# Patient Record
Sex: MALE | Race: WHITE | URBAN - METROPOLITAN AREA
[De-identification: names, ages, dates, MRNs, and addresses within clinical notes are randomized per-mention and may not be internally consistent; named-entity substitution may affect disease eponyms.]

---

## 2019-05-28 PROCEDURE — 88305 TISSUE EXAM BY PATHOLOGIST: CPT | Performed by: INTERNAL MEDICINE

## 2019-05-30 PROCEDURE — 88305 TISSUE EXAM BY PATHOLOGIST: CPT | Performed by: INTERNAL MEDICINE

## 2020-02-09 ENCOUNTER — HOSPITAL ENCOUNTER (INPATIENT)
Facility: HOSPITAL | Age: 65
LOS: 3 days | Discharge: HOME | DRG: 378 | End: 2020-02-12
Attending: EMERGENCY MEDICINE | Admitting: HOSPITALIST
Payer: MEDICARE

## 2020-02-09 DIAGNOSIS — K92.2 GASTROINTESTINAL HEMORRHAGE, UNSPECIFIED GASTROINTESTINAL HEMORRHAGE TYPE: Primary | ICD-10-CM

## 2020-02-09 LAB
ALBUMIN SERPL-MCNC: 3.3 G/DL (ref 3.4–5)
ALBUMIN/GLOB SERPL: 0.8 {RATIO} (ref 1–2)
ALP LIVER SERPL-CCNC: 82 U/L (ref 45–117)
ALT SERPL-CCNC: 35 U/L (ref 16–61)
ANION GAP SERPL CALC-SCNC: 4 MMOL/L (ref 0–18)
ANTIBODY SCREEN: NEGATIVE
APTT PPP: 31.4 SECONDS (ref 25.4–36.1)
AST SERPL-CCNC: 27 U/L (ref 15–37)
BASOPHILS # BLD AUTO: 0.05 X10(3) UL (ref 0–0.2)
BASOPHILS # BLD AUTO: 0.08 X10(3) UL (ref 0–0.2)
BASOPHILS NFR BLD AUTO: 0.6 %
BASOPHILS NFR BLD AUTO: 0.8 %
BILIRUB SERPL-MCNC: 0.8 MG/DL (ref 0.1–2)
BUN BLD-MCNC: 30 MG/DL (ref 7–18)
BUN/CREAT SERPL: 24.6 (ref 10–20)
CALCIUM BLD-MCNC: 9.1 MG/DL (ref 8.5–10.1)
CHLORIDE SERPL-SCNC: 109 MMOL/L (ref 98–112)
CO2 SERPL-SCNC: 27 MMOL/L (ref 21–32)
CREAT BLD-MCNC: 1.22 MG/DL (ref 0.7–1.3)
DEPRECATED RDW RBC AUTO: 45 FL (ref 35.1–46.3)
DEPRECATED RDW RBC AUTO: 45.2 FL (ref 35.1–46.3)
EOSINOPHIL # BLD AUTO: 0.08 X10(3) UL (ref 0–0.7)
EOSINOPHIL # BLD AUTO: 0.19 X10(3) UL (ref 0–0.7)
EOSINOPHIL NFR BLD AUTO: 1 %
EOSINOPHIL NFR BLD AUTO: 2 %
ERYTHROCYTE [DISTWIDTH] IN BLOOD BY AUTOMATED COUNT: 13.9 % (ref 11–15)
ERYTHROCYTE [DISTWIDTH] IN BLOOD BY AUTOMATED COUNT: 14 % (ref 11–15)
GLOBULIN PLAS-MCNC: 4 G/DL (ref 2.8–4.4)
GLUCOSE BLD-MCNC: 162 MG/DL (ref 70–99)
GLUCOSE BLD-MCNC: 210 MG/DL (ref 70–99)
HCT VFR BLD AUTO: 34 % (ref 39–53)
HCT VFR BLD AUTO: 41.6 % (ref 39–53)
HGB BLD-MCNC: 11 G/DL (ref 13–17.5)
HGB BLD-MCNC: 13.5 G/DL (ref 13–17.5)
IMM GRANULOCYTES # BLD AUTO: 0.02 X10(3) UL (ref 0–1)
IMM GRANULOCYTES # BLD AUTO: 0.03 X10(3) UL (ref 0–1)
IMM GRANULOCYTES NFR BLD: 0.2 %
IMM GRANULOCYTES NFR BLD: 0.3 %
INR BLD: 1.03 (ref 0.9–1.1)
LYMPHOCYTES # BLD AUTO: 1.57 X10(3) UL (ref 1–4)
LYMPHOCYTES # BLD AUTO: 2.02 X10(3) UL (ref 1–4)
LYMPHOCYTES NFR BLD AUTO: 19.5 %
LYMPHOCYTES NFR BLD AUTO: 21 %
M PROTEIN MFR SERPL ELPH: 7.3 G/DL (ref 6.4–8.2)
MCH RBC QN AUTO: 28.7 PG (ref 26–34)
MCH RBC QN AUTO: 29 PG (ref 26–34)
MCHC RBC AUTO-ENTMCNC: 32.4 G/DL (ref 31–37)
MCHC RBC AUTO-ENTMCNC: 32.5 G/DL (ref 31–37)
MCV RBC AUTO: 88.8 FL (ref 80–100)
MCV RBC AUTO: 89.5 FL (ref 80–100)
MONOCYTES # BLD AUTO: 0.62 X10(3) UL (ref 0.1–1)
MONOCYTES # BLD AUTO: 0.63 X10(3) UL (ref 0.1–1)
MONOCYTES NFR BLD AUTO: 6.4 %
MONOCYTES NFR BLD AUTO: 7.8 %
NEUTROPHILS # BLD AUTO: 5.7 X10 (3) UL (ref 1.5–7.7)
NEUTROPHILS # BLD AUTO: 5.7 X10(3) UL (ref 1.5–7.7)
NEUTROPHILS # BLD AUTO: 6.7 X10 (3) UL (ref 1.5–7.7)
NEUTROPHILS # BLD AUTO: 6.7 X10(3) UL (ref 1.5–7.7)
NEUTROPHILS NFR BLD AUTO: 69.5 %
NEUTROPHILS NFR BLD AUTO: 70.9 %
OSMOLALITY SERPL CALC.SUM OF ELEC: 302 MOSM/KG (ref 275–295)
PLATELET # BLD AUTO: 188 10(3)UL (ref 150–450)
PLATELET # BLD AUTO: 232 10(3)UL (ref 150–450)
POTASSIUM SERPL-SCNC: 3.9 MMOL/L (ref 3.5–5.1)
PSA SERPL DL<=0.01 NG/ML-MCNC: 13.9 SECONDS (ref 12.5–14.7)
RBC # BLD AUTO: 3.83 X10(6)UL (ref 3.8–5.8)
RBC # BLD AUTO: 4.65 X10(6)UL (ref 3.8–5.8)
RH BLOOD TYPE: POSITIVE
SODIUM SERPL-SCNC: 140 MMOL/L (ref 136–145)
WBC # BLD AUTO: 8.1 X10(3) UL (ref 4–11)
WBC # BLD AUTO: 9.6 X10(3) UL (ref 4–11)

## 2020-02-09 PROCEDURE — 99291 CRITICAL CARE FIRST HOUR: CPT | Performed by: NURSE PRACTITIONER

## 2020-02-09 PROCEDURE — 99223 1ST HOSP IP/OBS HIGH 75: CPT | Performed by: STUDENT IN AN ORGANIZED HEALTH CARE EDUCATION/TRAINING PROGRAM

## 2020-02-09 RX ORDER — SODIUM CHLORIDE 9 MG/ML
INJECTION, SOLUTION INTRAVENOUS CONTINUOUS
Status: DISCONTINUED | OUTPATIENT
Start: 2020-02-09 | End: 2020-02-10

## 2020-02-09 NOTE — H&P
ZAN HOSPITALIST  History and Physical     Corey Cue Patient Status:  Emergency    4/3/1955 MRN [id number]   Location [address] Attending Felisa Marquez MD   Hosp Day # 0 PCP Chandan Rajan MD     Chief Complain appears weak  Respiratory: Clear to auscultation bilaterally. No wheezes. No rhonchi. Cardiovascular: S1, S2. Regular rate and rhythm. No murmurs, rubs or gallops. Equal pulses. Chest and Back: No tenderness or deformity.   Abdomen: Soft, nontender, nond

## 2020-02-09 NOTE — ED INITIAL ASSESSMENT (HPI)
Patient relates he noticed black stools that began yesterday. Patient denies abd pain. Patient also complaining of diarrhea.

## 2020-02-09 NOTE — CONSULTS
[address] [facility],[address] Patient Status:  Emergency    4/3/1955 MRN [id number]   Location [address] Attending Deandra Solorio MD   Hosp Day # 0 PCP MD Corie Moynat Monte is a 75year old male skin lesions  HEENT: denies jaundice   LUNGS: denies SOB   CV: denies chest pain GI: denies dysphagia, N/V  : denies hematuria    MSK: has no joint painENDOCR: denies diabetes or thyroid history  EXAM:   /77   Pulse 72   Temp 97 °F (36.1 °C) (Tempo

## 2020-02-09 NOTE — ED NOTES
Pt moved to room C1 at this time after witnessed syncopal episode by PCT Radha Resendez. Pt placed on cardiac monitor, ED physician to bedside. Skin appears diaphoretic, pt is pale. 2nd IV established, vital signs obtained.

## 2020-02-10 ENCOUNTER — ANESTHESIA EVENT (OUTPATIENT)
Dept: ENDOSCOPY | Facility: HOSPITAL | Age: 65
DRG: 378 | End: 2020-02-10
Payer: MEDICARE

## 2020-02-10 ENCOUNTER — ANESTHESIA (OUTPATIENT)
Dept: ENDOSCOPY | Facility: HOSPITAL | Age: 65
DRG: 378 | End: 2020-02-10
Payer: MEDICARE

## 2020-02-10 LAB
ANION GAP SERPL CALC-SCNC: 5 MMOL/L (ref 0–18)
ATRIAL RATE: 66 BPM
BASOPHILS # BLD AUTO: 0.04 X10(3) UL (ref 0–0.2)
BASOPHILS NFR BLD AUTO: 0.5 %
BUN BLD-MCNC: 26 MG/DL (ref 7–18)
BUN/CREAT SERPL: 28.3 (ref 10–20)
CALCIUM BLD-MCNC: 8 MG/DL (ref 8.5–10.1)
CHLORIDE SERPL-SCNC: 112 MMOL/L (ref 98–112)
CO2 SERPL-SCNC: 27 MMOL/L (ref 21–32)
CREAT BLD-MCNC: 0.92 MG/DL (ref 0.7–1.3)
DEPRECATED RDW RBC AUTO: 46.1 FL (ref 35.1–46.3)
EOSINOPHIL # BLD AUTO: 0.07 X10(3) UL (ref 0–0.7)
EOSINOPHIL NFR BLD AUTO: 0.8 %
ERYTHROCYTE [DISTWIDTH] IN BLOOD BY AUTOMATED COUNT: 14.1 % (ref 11–15)
GLUCOSE BLD-MCNC: 136 MG/DL (ref 70–99)
HCT VFR BLD AUTO: 33.9 % (ref 39–53)
HGB BLD-MCNC: 10 G/DL (ref 13–17.5)
HGB BLD-MCNC: 11 G/DL (ref 13–17.5)
HGB BLD-MCNC: 11 G/DL (ref 13–17.5)
HGB BLD-MCNC: 11.1 G/DL (ref 13–17.5)
HGB BLD-MCNC: 11.4 G/DL (ref 13–17.5)
IMM GRANULOCYTES # BLD AUTO: 0.03 X10(3) UL (ref 0–1)
IMM GRANULOCYTES NFR BLD: 0.3 %
LYMPHOCYTES # BLD AUTO: 2.17 X10(3) UL (ref 1–4)
LYMPHOCYTES NFR BLD AUTO: 25.2 %
MCH RBC QN AUTO: 29.3 PG (ref 26–34)
MCHC RBC AUTO-ENTMCNC: 32.4 G/DL (ref 31–37)
MCV RBC AUTO: 90.4 FL (ref 80–100)
MONOCYTES # BLD AUTO: 0.62 X10(3) UL (ref 0.1–1)
MONOCYTES NFR BLD AUTO: 7.2 %
NEUTROPHILS # BLD AUTO: 5.69 X10 (3) UL (ref 1.5–7.7)
NEUTROPHILS # BLD AUTO: 5.69 X10(3) UL (ref 1.5–7.7)
NEUTROPHILS NFR BLD AUTO: 66 %
OSMOLALITY SERPL CALC.SUM OF ELEC: 305 MOSM/KG (ref 275–295)
P AXIS: 24 DEGREES
P-R INTERVAL: 170 MS
PLATELET # BLD AUTO: 176 10(3)UL (ref 150–450)
POTASSIUM SERPL-SCNC: 3.9 MMOL/L (ref 3.5–5.1)
Q-T INTERVAL: 400 MS
QRS DURATION: 88 MS
QTC CALCULATION (BEZET): 419 MS
R AXIS: 13 DEGREES
RBC # BLD AUTO: 3.75 X10(6)UL (ref 3.8–5.8)
SODIUM SERPL-SCNC: 144 MMOL/L (ref 136–145)
T AXIS: 39 DEGREES
VENTRICULAR RATE: 66 BPM
WBC # BLD AUTO: 8.6 X10(3) UL (ref 4–11)

## 2020-02-10 PROCEDURE — 0W3P8ZZ CONTROL BLEEDING IN GASTROINTESTINAL TRACT, VIA NATURAL OR ARTIFICIAL OPENING ENDOSCOPIC: ICD-10-PCS | Performed by: INTERNAL MEDICINE

## 2020-02-10 PROCEDURE — 0DB68ZX EXCISION OF STOMACH, VIA NATURAL OR ARTIFICIAL OPENING ENDOSCOPIC, DIAGNOSTIC: ICD-10-PCS | Performed by: INTERNAL MEDICINE

## 2020-02-10 PROCEDURE — 99233 SBSQ HOSP IP/OBS HIGH 50: CPT | Performed by: STUDENT IN AN ORGANIZED HEALTH CARE EDUCATION/TRAINING PROGRAM

## 2020-02-10 RX ORDER — SODIUM CHLORIDE, SODIUM LACTATE, POTASSIUM CHLORIDE, CALCIUM CHLORIDE 600; 310; 30; 20 MG/100ML; MG/100ML; MG/100ML; MG/100ML
INJECTION, SOLUTION INTRAVENOUS CONTINUOUS
Status: CANCELLED | OUTPATIENT
Start: 2020-02-10

## 2020-02-10 RX ORDER — SUCRALFATE ORAL 1 G/10ML
1 SUSPENSION ORAL
Status: DISCONTINUED | OUTPATIENT
Start: 2020-02-10 | End: 2020-02-11

## 2020-02-10 RX ORDER — LIDOCAINE HYDROCHLORIDE 10 MG/ML
INJECTION, SOLUTION EPIDURAL; INFILTRATION; INTRACAUDAL; PERINEURAL AS NEEDED
Status: DISCONTINUED | OUTPATIENT
Start: 2020-02-10 | End: 2020-02-10 | Stop reason: SURG

## 2020-02-10 RX ORDER — SODIUM CHLORIDE, SODIUM LACTATE, POTASSIUM CHLORIDE, CALCIUM CHLORIDE 600; 310; 30; 20 MG/100ML; MG/100ML; MG/100ML; MG/100ML
INJECTION, SOLUTION INTRAVENOUS CONTINUOUS PRN
Status: DISCONTINUED | OUTPATIENT
Start: 2020-02-10 | End: 2020-02-10 | Stop reason: SURG

## 2020-02-10 RX ORDER — DEXTROSE MONOHYDRATE 25 G/50ML
50 INJECTION, SOLUTION INTRAVENOUS
Status: CANCELLED | OUTPATIENT
Start: 2020-02-10

## 2020-02-10 RX ORDER — NALOXONE HYDROCHLORIDE 0.4 MG/ML
80 INJECTION, SOLUTION INTRAMUSCULAR; INTRAVENOUS; SUBCUTANEOUS AS NEEDED
Status: CANCELLED | OUTPATIENT
Start: 2020-02-10 | End: 2020-02-10

## 2020-02-10 RX ADMIN — LIDOCAINE HYDROCHLORIDE 50 MG: 10 INJECTION, SOLUTION EPIDURAL; INFILTRATION; INTRACAUDAL; PERINEURAL at 11:13:00

## 2020-02-10 RX ADMIN — SODIUM CHLORIDE, SODIUM LACTATE, POTASSIUM CHLORIDE, CALCIUM CHLORIDE: 600; 310; 30; 20 INJECTION, SOLUTION INTRAVENOUS at 11:07:00

## 2020-02-10 NOTE — CONSULTS
Pulmonary H&P/Consult       NAME: Frida De Leon - ROOM: [address] [place], [place] [address] - MRN: [id number] - Age: 75year old - :  4/3/1955    Date of Admission: 2020  4:19 PM  Admission Diagnosis: Gastrointestinal hemorrhage, unspecified gastrointestinal hemorrhage t Non-medical: Not on file    Tobacco Use      Smoking status: Former Smoker        Packs/day: 0.30        Years: 2.00        Pack years: .6        Types: Cigarettes        Quit date: 1976        Years since quittin.1      Smokeless tobacco: Never U GI: see above  :  denies dysuria or changes in stream   MUSCULOSKELETAL:  denies back pain   NEURO:  denies headaches, no strokes or seizure history   PSYCHE:  denies depression or anxiety   HEMATOLOGIC:  denies hx of anemia   ENDOCRINE:  denies thyroi quadrants,     no masses, no organomegaly   Extremities:   Extremities normal, atraumatic, no cyanosis or edema   Pulses:   2+ and symmetric all extremities   Skin:   Skin color, texture, turgor normal, no rashes or lesions   Neurologic:   CNII-XII intact.

## 2020-02-10 NOTE — BRIEF OP NOTE
Pre-Operative Diagnosis: hematemesis / hematochezia     Post-Operative Diagnosis: Sever gastritis, hiatal hernia, duodenal ulcers with visable vessels       Procedure Performed:   Procedure(s):  Esophagogastroduodenoscopy with bipoler     Surgeon(s) and Ro

## 2020-02-10 NOTE — PLAN OF CARE
Patient admitted to ICU, family at bedside. Alert, oriented, able to make needs known. Mild abdominal pain has resolved. No nausea or vomiting. 1 loose dark red BM. Hemoglobin stable overnight. VSS. On room air. Voiding in urinal. IVF infusing per order.  Fithian Medici

## 2020-02-10 NOTE — ED PROVIDER NOTES
Patient Seen in: [facility] 4sw-a      History   Patient presents with:  GI Bleeding    Stated Complaint: blood in stool starting yesterday, weakness, fatigue    HPI    Patient is a 57-year-old male comes in emergency room for evaluation of black st (Temporal)   Resp 14   Ht 177.8 cm (5' 10\")   Wt 102.1 kg   SpO2 96%   BMI 32.28 kg/m²         Physical Exam    GENERAL: No acute distress, well appearing and non-toxic, Alert and oriented X 3   HEENT: Normocephalic, atraumatic. Moist mucous membranes. ---------                               -----------         ------                     CBC W/ DIFFERENTIAL[[id number]]                              Final result                 Please view results for these tests on the individual orders.    CBC WITH DI gastroneurology, Dr. Kalli Shepard who evaluated patient in the emergency room. Also discussed case with critical care, Dr. Glee Nissen hospitalist, Dr. Han Lu. MDM     Patient is a 41-year-old male comes in emergency room for evaluation of GI bleed.   Syncopal

## 2020-02-10 NOTE — ED NOTES
Pt lying flat on stretcher dr. Jakub Wheat @. Pt is pale skin is warm and dry. +smell of melena. Pt c/o feeling bloated. +abd. Distention soft to touch bs hypoactive x 4.  Family at

## 2020-02-10 NOTE — PLAN OF CARE
Patient hemodynamically stable. Afebrile. One red streaked stool this morning. EGD completed late this morning. Tolerating clear liquid diet. Ambulating to the bathroom-asymptomatic. Carafate started. IV protonix scheduled BID.  Stable hemoglobin (scheduled

## 2020-02-10 NOTE — OPERATIVE REPORT
PATIENT NAME: Bryanna Hale  MRN: [id number]  DATE OF OPERATION: 2/10/20  REFERRING PHYSICIAN: Dr. Josh Holt  Medications:  MAC  PREOPERATIVE DIAGNOSIS    Hematemesis  POSTOPERATIVE DIAGNOSIS:  1. 3 cm hiatal hernia  2.  Severe gastritis with multiple 2 - written summary of today's endoscopic findings  2. Carafate 1 gm po tid. Protonix 40 mg iv bid. 3. Clear liquid diet. 4. No asa or nsaids - as current UGI bleeding episode occurred with use of aspirin last week. 5. Monitor H and H.  Would observe pt in

## 2020-02-10 NOTE — ANESTHESIA POSTPROCEDURE EVALUATION
[address] [facility],[address] Patient Status:  Inpatient   Age/Gender 75year old male MRN [id number]   Location [facility]. Attending Bhumi Childress MD   Hosp Day # 1 PCP Megan Nguyen MD       Anesthesia Post-op Note    Procedure(s):

## 2020-02-10 NOTE — ANESTHESIA PREPROCEDURE EVALUATION
PRE-OP EVALUATION    Patient Name: Silvano Landon    Pre-op Diagnosis: hematemesis / hematochezia    Procedure(s):  Esophagogastroduodenoscopy    Surgeon(s) and Role:     * Maxx Pinto MD - Primary    Pre-op vitals reviewed.   Temp: 98.4 °F (36.9 ° Currently      Comment: occ      Drug use: No     Available pre-op labs reviewed.   Lab Results   Component Value Date    WBC 8.6 02/10/2020    RBC 3.75 (L) 02/10/2020    HGB 11.0 (L) 02/10/2020    HGB 11.0 (L) 02/10/2020    HCT 33.9 (L) 02/10/2020    MCV 9

## 2020-02-10 NOTE — PROGRESS NOTES
ICU  Critical Care APRN Progress Note    NAME: Normand Heimlich - ROOM: [address] [place] [address] - MRN: [id number] - Age: 75year old - :4/3/1955    History Of Present Illness:  Normand Heimlich is a 75year old male with PMHx significant for diverticulitis and duo stated age  Neck: No JVD, neck supple, no adenopathy, trachea midline, no carotid bruits  Lungs: Clear to auscultation bilaterally, respirations unlabored  Heart: Regular rate and rhythm, S1 and S2 normal, no murmur, rub or gallop  Abdomen: Soft, non-tende (exclusive of billable procedures) was administered. This involved direct patient intervention, complex decision making, and/or extensive discussions (>50% face to face time) with the patient, family, and clinical staff.     JHONATAN Dee-BC  ICU  Phone

## 2020-02-10 NOTE — BH RN ADMISSION NOTE
Received patient from ED A&Ox4 on RA. Patient reported moderate amount of dark emesis upon arrival to unit. VSS. Hgb ordered Q6H, IVF started on patient. No c/o nausea or pain upon initial assessment. Family at bedside and updated on plan of care.

## 2020-02-10 NOTE — PROGRESS NOTES
[facility] HOSPITALIST  Progress Note     Iraj Grider Patient Status:  Inpatient    4/3/1955 MRN [id number]   [facility] 4SW-A Attending Jaylin Blandon MD   Hosp Day # 1 PCP Chalino Duran MD     Chief Complaint: Melena  S:  Denies any duodenal ulcers no bleeding  2. Trend H-H  3. PPI BID, carafate tid   4. CLD  5. Monitor for 48 hrs   6. No ASA/ NSAIDS    7. Transfuse for hb < 7  8.  GI on Cs  PLAN:  CLD  PPI, carafate tid  Monitor 48 hrs    Monitor H&H   Needs f/u TCC clinic and wants n

## 2020-02-10 NOTE — PROGRESS NOTES
[facility] HOSPITALIST  Progress Note     Gamaliel Bare Patient Status:  Inpatient    4/3/1955 MRN [id number]   [facility] 4SW-A Attending Inez Stuart MD   Hosp Day # 1 PCP Gabrielle Guzman MD     Chief Complaint: GIB     S: Patient  Seen hours.         Imaging: Imaging data reviewed in Epic. Medications:   • sucralfate  1 g Oral TID AC and HS   • pantoprazole (PROTONIX) IV push  40 mg Intravenous Q12H       ASSESSMENT / PLAN:     1. Acute blood loss anemia due to GIB  2. GIB from DU  1.

## 2020-02-11 LAB
ANION GAP SERPL CALC-SCNC: 5 MMOL/L (ref 0–18)
BASOPHILS # BLD AUTO: 0.04 X10(3) UL (ref 0–0.2)
BASOPHILS # BLD AUTO: 0.05 X10(3) UL (ref 0–0.2)
BASOPHILS NFR BLD AUTO: 0.6 %
BASOPHILS NFR BLD AUTO: 0.7 %
BUN BLD-MCNC: 16 MG/DL (ref 7–18)
BUN/CREAT SERPL: 16.8 (ref 10–20)
C DIFF TOX B STL QL: NEGATIVE
CALCIUM BLD-MCNC: 8 MG/DL (ref 8.5–10.1)
CHLORIDE SERPL-SCNC: 111 MMOL/L (ref 98–112)
CO2 SERPL-SCNC: 26 MMOL/L (ref 21–32)
CREAT BLD-MCNC: 0.95 MG/DL (ref 0.7–1.3)
DEPRECATED RDW RBC AUTO: 44.8 FL (ref 35.1–46.3)
DEPRECATED RDW RBC AUTO: 46.1 FL (ref 35.1–46.3)
EOSINOPHIL # BLD AUTO: 0.12 X10(3) UL (ref 0–0.7)
EOSINOPHIL # BLD AUTO: 0.15 X10(3) UL (ref 0–0.7)
EOSINOPHIL NFR BLD AUTO: 1.7 %
EOSINOPHIL NFR BLD AUTO: 2.3 %
ERYTHROCYTE [DISTWIDTH] IN BLOOD BY AUTOMATED COUNT: 14 % (ref 11–15)
ERYTHROCYTE [DISTWIDTH] IN BLOOD BY AUTOMATED COUNT: 14.3 % (ref 11–15)
GLUCOSE BLD-MCNC: 100 MG/DL (ref 70–99)
HCT VFR BLD AUTO: 31 % (ref 39–53)
HCT VFR BLD AUTO: 32.5 % (ref 39–53)
HGB BLD-MCNC: 10.2 G/DL (ref 13–17.5)
HGB BLD-MCNC: 10.9 G/DL (ref 13–17.5)
IMM GRANULOCYTES # BLD AUTO: 0.01 X10(3) UL (ref 0–1)
IMM GRANULOCYTES # BLD AUTO: 0.06 X10(3) UL (ref 0–1)
IMM GRANULOCYTES NFR BLD: 0.2 %
IMM GRANULOCYTES NFR BLD: 0.8 %
LYMPHOCYTES # BLD AUTO: 1.83 X10(3) UL (ref 1–4)
LYMPHOCYTES # BLD AUTO: 2.15 X10(3) UL (ref 1–4)
LYMPHOCYTES NFR BLD AUTO: 25.4 %
LYMPHOCYTES NFR BLD AUTO: 32.3 %
MCH RBC QN AUTO: 29.3 PG (ref 26–34)
MCH RBC QN AUTO: 29.4 PG (ref 26–34)
MCHC RBC AUTO-ENTMCNC: 32.9 G/DL (ref 31–37)
MCHC RBC AUTO-ENTMCNC: 33.5 G/DL (ref 31–37)
MCV RBC AUTO: 87.6 FL (ref 80–100)
MCV RBC AUTO: 89.1 FL (ref 80–100)
MONOCYTES # BLD AUTO: 0.4 X10(3) UL (ref 0.1–1)
MONOCYTES # BLD AUTO: 0.5 X10(3) UL (ref 0.1–1)
MONOCYTES NFR BLD AUTO: 6 %
MONOCYTES NFR BLD AUTO: 6.9 %
NEUTROPHILS # BLD AUTO: 3.9 X10 (3) UL (ref 1.5–7.7)
NEUTROPHILS # BLD AUTO: 3.9 X10(3) UL (ref 1.5–7.7)
NEUTROPHILS # BLD AUTO: 4.64 X10 (3) UL (ref 1.5–7.7)
NEUTROPHILS # BLD AUTO: 4.64 X10(3) UL (ref 1.5–7.7)
NEUTROPHILS NFR BLD AUTO: 58.6 %
NEUTROPHILS NFR BLD AUTO: 64.5 %
OSMOLALITY SERPL CALC.SUM OF ELEC: 295 MOSM/KG (ref 275–295)
PLATELET # BLD AUTO: 147 10(3)UL (ref 150–450)
PLATELET # BLD AUTO: 172 10(3)UL (ref 150–450)
POTASSIUM SERPL-SCNC: 3.5 MMOL/L (ref 3.5–5.1)
RBC # BLD AUTO: 3.48 X10(6)UL (ref 3.8–5.8)
RBC # BLD AUTO: 3.71 X10(6)UL (ref 3.8–5.8)
SODIUM SERPL-SCNC: 142 MMOL/L (ref 136–145)
WBC # BLD AUTO: 6.7 X10(3) UL (ref 4–11)
WBC # BLD AUTO: 7.2 X10(3) UL (ref 4–11)

## 2020-02-11 RX ORDER — SUCRALFATE ORAL 1 G/10ML
1 SUSPENSION ORAL
Qty: 1 BOTTLE | Refills: 0 | Status: SHIPPED | OUTPATIENT
Start: 2020-02-11 | End: 2020-02-12

## 2020-02-11 RX ORDER — PANTOPRAZOLE SODIUM 40 MG/1
40 TABLET, DELAYED RELEASE ORAL
Qty: 120 TABLET | Refills: 0 | Status: SHIPPED | OUTPATIENT
Start: 2020-02-11 | End: 2020-02-12

## 2020-02-11 NOTE — PROGRESS NOTES
Received patient from ICU. Alert and orientated. Oxygen WNL on room air. NSR per tele. Pt with no complaints of pain, medications given per MAR. IV SL. Tolerating CLD. Up with assistance.  JAMILA

## 2020-02-11 NOTE — PROGRESS NOTES
ZAN HOSPITALIST  Progress Note     Grzegorz Osei Patient Status:  Inpatient    4/3/1955 MRN [id number]   [facility] 4SW-A Attending Luke Cohen MD   Hosp Day # 2 PCP Toshia Couch MD     Chief Complaint: Melena  S:  Denies pain Epic.  ASSESSMENT / PLAN:   1. Symptomatic anemia due to GIB  1. S/p EGD 2/10  Severe gastritis, hiatal hernia, duodenal ulcers no bleeding  2. Trend H-H  Stable   3. PPI BID, carafate tid   4. FLD  5. Monitor for 48 hrs   6.  No ASA/ NSAIDS    7. GI on Cs

## 2020-02-11 NOTE — PLAN OF CARE
Report given to [facility], RN. Patient to transfer to room 517 via wheelchair with telemetry. Belongings present upon transfer.

## 2020-02-11 NOTE — PLAN OF CARE
Pt AOx4. VSS on RA. Tele, NSR. Scds. Voids. No c.o pain. SBA. Loose stools today, cdiff negative. Black stools, GI MD aware. Saline locked. Soft diet, tolerating well. Per GI, pt observing pt overnight d/t increased black stools. Pt updated on poc, wctm. interventions unsuccessful or patient reports new pain  - Anticipate increased pain with activity and pre-medicate as appropriate  Outcome: Progressing     Problem: SAFETY ADULT - FALL  Goal: Free from fall injury  Description  INTERVENTIONS:  - Assess pt

## 2020-02-12 VITALS
BODY MASS INDEX: 31.52 KG/M2 | HEIGHT: 70 IN | TEMPERATURE: 98 F | OXYGEN SATURATION: 96 % | HEART RATE: 73 BPM | WEIGHT: 220.19 LBS | DIASTOLIC BLOOD PRESSURE: 84 MMHG | RESPIRATION RATE: 20 BRPM | SYSTOLIC BLOOD PRESSURE: 140 MMHG

## 2020-02-12 LAB
ANION GAP SERPL CALC-SCNC: 6 MMOL/L (ref 0–18)
BASOPHILS # BLD AUTO: 0.04 X10(3) UL (ref 0–0.2)
BASOPHILS NFR BLD AUTO: 0.6 %
BUN BLD-MCNC: 13 MG/DL (ref 7–18)
BUN/CREAT SERPL: 12.9 (ref 10–20)
CALCIUM BLD-MCNC: 8.2 MG/DL (ref 8.5–10.1)
CHLORIDE SERPL-SCNC: 109 MMOL/L (ref 98–112)
CO2 SERPL-SCNC: 26 MMOL/L (ref 21–32)
CREAT BLD-MCNC: 1.01 MG/DL (ref 0.7–1.3)
DEPRECATED RDW RBC AUTO: 44.5 FL (ref 35.1–46.3)
EOSINOPHIL # BLD AUTO: 0.21 X10(3) UL (ref 0–0.7)
EOSINOPHIL NFR BLD AUTO: 3.1 %
ERYTHROCYTE [DISTWIDTH] IN BLOOD BY AUTOMATED COUNT: 14.3 % (ref 11–15)
GLUCOSE BLD-MCNC: 129 MG/DL (ref 70–99)
HCT VFR BLD AUTO: 30.7 % (ref 39–53)
HGB BLD-MCNC: 10.5 G/DL (ref 13–17.5)
IMM GRANULOCYTES # BLD AUTO: 0.02 X10(3) UL (ref 0–1)
IMM GRANULOCYTES NFR BLD: 0.3 %
LYMPHOCYTES # BLD AUTO: 1.95 X10(3) UL (ref 1–4)
LYMPHOCYTES NFR BLD AUTO: 28.8 %
MCH RBC QN AUTO: 29.8 PG (ref 26–34)
MCHC RBC AUTO-ENTMCNC: 34.2 G/DL (ref 31–37)
MCV RBC AUTO: 87.2 FL (ref 80–100)
MONOCYTES # BLD AUTO: 0.48 X10(3) UL (ref 0.1–1)
MONOCYTES NFR BLD AUTO: 7.1 %
NEUTROPHILS # BLD AUTO: 4.08 X10 (3) UL (ref 1.5–7.7)
NEUTROPHILS # BLD AUTO: 4.08 X10(3) UL (ref 1.5–7.7)
NEUTROPHILS NFR BLD AUTO: 60.1 %
OSMOLALITY SERPL CALC.SUM OF ELEC: 294 MOSM/KG (ref 275–295)
PLATELET # BLD AUTO: 168 10(3)UL (ref 150–450)
POTASSIUM SERPL-SCNC: 3.3 MMOL/L (ref 3.5–5.1)
RBC # BLD AUTO: 3.52 X10(6)UL (ref 3.8–5.8)
SODIUM SERPL-SCNC: 141 MMOL/L (ref 136–145)
WBC # BLD AUTO: 6.8 X10(3) UL (ref 4–11)

## 2020-02-12 PROCEDURE — 99239 HOSP IP/OBS DSCHRG MGMT >30: CPT | Performed by: HOSPITALIST

## 2020-02-12 RX ORDER — OMEPRAZOLE 20 MG/1
20 CAPSULE, DELAYED RELEASE ORAL
Qty: 60 CAPSULE | Refills: 3 | Status: SHIPPED | OUTPATIENT
Start: 2020-02-12

## 2020-02-12 RX ORDER — SALIVA STIMULANT COMB. NO.3
SPRAY, NON-AEROSOL (ML) MUCOUS MEMBRANE AS NEEDED
Status: DISCONTINUED | OUTPATIENT
Start: 2020-02-12 | End: 2020-02-12

## 2020-02-12 NOTE — PROGRESS NOTES
[facility]  Progress Note    Corey Cue Patient Status:  Inpatient    4/3/1955 MRN [id number]   [facility] 5NW-A Attending Cherelle Biggs MD   Hosp Day # 2 PCP Chandan Rajan MD     Subjective:   Had three black loose stools toda Hematemesis  Anemia  Duodenal ulcer with visible vessel   Severe gastritis   Three loose black stools today with stable hgb and decreased BUN - likely old blood.   However, given that pt had visible vessel in duodenal ulcer, bicapped, would watch overnigh

## 2020-02-12 NOTE — PLAN OF CARE
Problem: Patient/Family Goals  Goal: Patient/Family Long Term Goal  Description  Patient's Long Term Goal: \"no more bleeding\"    Interventions:  - GI consult  - IV protonix BID  - NPO, possible EGD in AM  - monitor hgb Q6 hours  - See additional Care P Free from fall injury  Description  INTERVENTIONS:  - Assess pt frequently for physical needs  - Identify cognitive and physical deficits and behaviors that affect risk of falls.   - [place] fall precautions as indicated by assessment.  - Educate pt/famil

## 2020-02-12 NOTE — PROGRESS NOTES
NURSING DISCHARGE NOTE    Discharged Home via Ambulatory. Accompanied by Family member and Support staff  Belongings Taken by patient/family. Pt and daughter received discharge instructions and education. PIV removed.  New prescriptions and follow u

## 2020-02-12 NOTE — PLAN OF CARE
Problem: Patient/Family Goals  Goal: Patient/Family Long Term Goal  Description  Patient's Long Term Goal: \"no more bleeding\"    Interventions:  - GI consult  - IV protonix BID  - NPO, possible EGD in AM  - monitor hgb Q6 hours  - See additional Care P injury  Description  INTERVENTIONS:  - Assess pt frequently for physical needs  - Identify cognitive and physical deficits and behaviors that affect risk of falls.   - [place] fall precautions as indicated by assessment.  - Educate pt/family on patient sa

## 2020-02-13 ENCOUNTER — PATIENT OUTREACH (OUTPATIENT)
Dept: CASE MANAGEMENT | Age: 65
End: 2020-02-13

## 2020-02-13 DIAGNOSIS — K92.2 GASTROINTESTINAL HEMORRHAGE, UNSPECIFIED GASTROINTESTINAL HEMORRHAGE TYPE: ICD-10-CM

## 2020-02-13 DIAGNOSIS — Z02.9 ENCOUNTERS FOR UNSPECIFIED ADMINISTRATIVE PURPOSE: ICD-10-CM

## 2020-02-13 PROCEDURE — 1111F DSCHRG MED/CURRENT MED MERGE: CPT

## 2020-02-13 NOTE — DISCHARGE SUMMARY
Nikko Messina HOSPITALIST  DISCHARGE SUMMARY     Natalee Michael Patient Status:  Inpatient    4/3/1955 MRN [id number]   [facility] 5NW-A Attending No att. providers found   Hosp Day # 3 PCP Jana Rogers MD     Date of Admission: 2020  Best 10.5.  Did not need a blood transfusion. Consult with GI taken to the GI lab underwent EGD found to have severe gastritis without active bleeding biopsies were negative for H. pylori. He had a duodenal ulcer that was treated with a gold probe.   Also rogerio omeprazole 20 MG Cpdr  Commonly known as:  PRILOSEC      Take 1 capsule (20 mg total) by mouth 2 (two) times daily before meals.    Quantity:  60 capsule  Refills:  3           Where to Get Your Medications      Please  your prescriptions at the lo

## 2020-02-13 NOTE — PROGRESS NOTES
Initial Post Discharge Follow Up   Discharge Date: 2/12/20  Contact Date: 2/13/2020    Consent Verification:  Assessment Completed With: Patient  HIPAA Verified?   Yes    Discharge Dx:  Severe gastritis  Gastrointestinal hemorrhage    Was TCC ordered: elías medication’s purpose & side effects reviewed? yes  o Do you have any questions about your new medication?  No  • Did you  your discharge medications when you left the hospital? Yes  • May I go over your medications with you to make sure we are not mi already scheduled HFU for 2-17-20 in TCC. Patient denies any questions or concerns at this time.       CCM referral placed:  Not Applicable    [x]  Discharge Summary, Discharge medications reviewed/discussed/and reconciled against outpatient medications wit

## 2021-03-15 DIAGNOSIS — Z23 NEED FOR VACCINATION: ICD-10-CM

## 2022-07-09 ENCOUNTER — HOSPITAL ENCOUNTER (OUTPATIENT)
Age: 67
Discharge: HOME | End: 2022-07-09
Payer: MEDICARE

## 2022-07-09 VITALS
BODY MASS INDEX: 32.21 KG/M2 | HEIGHT: 70 IN | TEMPERATURE: 98 F | WEIGHT: 225 LBS | OXYGEN SATURATION: 100 % | RESPIRATION RATE: 16 BRPM | HEART RATE: 86 BPM | SYSTOLIC BLOOD PRESSURE: 161 MMHG | DIASTOLIC BLOOD PRESSURE: 85 MMHG

## 2022-07-09 DIAGNOSIS — U07.1 COVID-19 VIRUS INFECTION: Primary | ICD-10-CM

## 2022-07-09 LAB — SARS-COV-2 RNA RESP QL NAA+PROBE: DETECTED

## 2022-07-09 PROCEDURE — 99213 OFFICE O/P EST LOW 20 MIN: CPT

## 2023-02-18 ENCOUNTER — HOSPITAL ENCOUNTER (OUTPATIENT)
Age: 68
Discharge: HOME | End: 2023-02-18
Attending: EMERGENCY MEDICINE
Payer: MEDICARE

## 2023-02-18 VITALS
TEMPERATURE: 100 F | OXYGEN SATURATION: 98 % | WEIGHT: 225 LBS | SYSTOLIC BLOOD PRESSURE: 178 MMHG | HEART RATE: 89 BPM | HEIGHT: 70 IN | DIASTOLIC BLOOD PRESSURE: 103 MMHG | BODY MASS INDEX: 32.21 KG/M2 | RESPIRATION RATE: 20 BRPM

## 2023-02-18 DIAGNOSIS — R03.0 ELEVATED BLOOD PRESSURE READING: ICD-10-CM

## 2023-02-18 DIAGNOSIS — U07.1 COVID-19 VIRUS INFECTION: Primary | ICD-10-CM

## 2023-02-18 LAB — SARS-COV-2 RNA RESP QL NAA+PROBE: DETECTED

## 2023-02-18 PROCEDURE — 99213 OFFICE O/P EST LOW 20 MIN: CPT

## 2023-02-18 RX ORDER — NIRMATRELVIR AND RITONAVIR 300-100 MG
KIT ORAL
Qty: 30 TABLET | Refills: 0 | Status: SHIPPED | OUTPATIENT
Start: 2023-02-18 | End: 2023-02-23

## 2023-02-18 NOTE — ED INITIAL ASSESSMENT (HPI)
Nasal congestion , dry throat - started yesterday. Pt tested +covid today from a home test. Pt requesting paxlovid.  Pt is not vaccinated

## 2025-02-20 ENCOUNTER — APPOINTMENT (OUTPATIENT)
Dept: GENERAL RADIOLOGY | Facility: HOSPITAL | Age: 70
End: 2025-02-20
Payer: MEDICARE

## 2025-02-20 ENCOUNTER — HOSPITAL ENCOUNTER (OUTPATIENT)
Facility: HOSPITAL | Age: 70
Setting detail: OBSERVATION
Discharge: HOME | End: 2025-02-21
Attending: STUDENT IN AN ORGANIZED HEALTH CARE EDUCATION/TRAINING PROGRAM | Admitting: STUDENT IN AN ORGANIZED HEALTH CARE EDUCATION/TRAINING PROGRAM
Payer: MEDICARE

## 2025-02-20 ENCOUNTER — APPOINTMENT (OUTPATIENT)
Dept: MRI IMAGING | Facility: HOSPITAL | Age: 70
End: 2025-02-20
Attending: STUDENT IN AN ORGANIZED HEALTH CARE EDUCATION/TRAINING PROGRAM
Payer: MEDICARE

## 2025-02-20 ENCOUNTER — APPOINTMENT (OUTPATIENT)
Dept: CT IMAGING | Facility: HOSPITAL | Age: 70
End: 2025-02-20
Attending: STUDENT IN AN ORGANIZED HEALTH CARE EDUCATION/TRAINING PROGRAM
Payer: MEDICARE

## 2025-02-20 DIAGNOSIS — I74.9 TIA DUE TO EMBOLISM (HCC): ICD-10-CM

## 2025-02-20 DIAGNOSIS — I10 HYPERTENSION, UNSPECIFIED TYPE: Primary | ICD-10-CM

## 2025-02-20 DIAGNOSIS — R26.81 GAIT INSTABILITY: ICD-10-CM

## 2025-02-20 DIAGNOSIS — G45.9 TIA DUE TO EMBOLISM (HCC): ICD-10-CM

## 2025-02-20 DIAGNOSIS — E11.9 TYPE 2 DIABETES MELLITUS WITHOUT COMPLICATION, WITHOUT LONG-TERM CURRENT USE OF INSULIN (HCC): ICD-10-CM

## 2025-02-20 LAB
ALBUMIN SERPL-MCNC: 4.9 G/DL (ref 3.2–4.8)
ALBUMIN/GLOB SERPL: 1.8 {RATIO} (ref 1–2)
ALP LIVER SERPL-CCNC: 89 U/L
ALT SERPL-CCNC: 40 U/L
ANION GAP SERPL CALC-SCNC: 9 MMOL/L (ref 0–18)
AST SERPL-CCNC: 32 U/L (ref ?–34)
BASOPHILS # BLD AUTO: 0.06 X10(3) UL (ref 0–0.2)
BASOPHILS NFR BLD AUTO: 0.8 %
BILIRUB SERPL-MCNC: 1 MG/DL (ref 0.2–1.1)
BUN BLD-MCNC: 11 MG/DL (ref 9–23)
CALCIUM BLD-MCNC: 10 MG/DL (ref 8.7–10.6)
CHLORIDE SERPL-SCNC: 103 MMOL/L (ref 98–112)
CO2 SERPL-SCNC: 30 MMOL/L (ref 21–32)
CREAT BLD-MCNC: 1.16 MG/DL
EGFRCR SERPLBLD CKD-EPI 2021: 67 ML/MIN/1.73M2 (ref 60–?)
EOSINOPHIL # BLD AUTO: 0.1 X10(3) UL (ref 0–0.7)
EOSINOPHIL NFR BLD AUTO: 1.4 %
ERYTHROCYTE [DISTWIDTH] IN BLOOD BY AUTOMATED COUNT: 13.3 %
GLOBULIN PLAS-MCNC: 2.7 G/DL (ref 2–3.5)
GLUCOSE BLD-MCNC: 156 MG/DL (ref 70–99)
HCT VFR BLD AUTO: 46.8 %
HGB BLD-MCNC: 16.8 G/DL
IMM GRANULOCYTES # BLD AUTO: 0.01 X10(3) UL (ref 0–1)
IMM GRANULOCYTES NFR BLD: 0.1 %
LYMPHOCYTES # BLD AUTO: 1.28 X10(3) UL (ref 1–4)
LYMPHOCYTES NFR BLD AUTO: 17.5 %
MCH RBC QN AUTO: 29.9 PG (ref 26–34)
MCHC RBC AUTO-ENTMCNC: 35.9 G/DL (ref 31–37)
MCV RBC AUTO: 83.3 FL
MONOCYTES # BLD AUTO: 0.57 X10(3) UL (ref 0.1–1)
MONOCYTES NFR BLD AUTO: 7.8 %
NEUTROPHILS # BLD AUTO: 5.31 X10 (3) UL (ref 1.5–7.7)
NEUTROPHILS # BLD AUTO: 5.31 X10(3) UL (ref 1.5–7.7)
NEUTROPHILS NFR BLD AUTO: 72.4 %
OSMOLALITY SERPL CALC.SUM OF ELEC: 297 MOSM/KG (ref 275–295)
PLATELET # BLD AUTO: 214 10(3)UL (ref 150–450)
POTASSIUM SERPL-SCNC: 4.1 MMOL/L (ref 3.5–5.1)
PROT SERPL-MCNC: 7.6 G/DL (ref 5.7–8.2)
RBC # BLD AUTO: 5.62 X10(6)UL
SODIUM SERPL-SCNC: 142 MMOL/L (ref 136–145)
TROPONIN I SERPL HS-MCNC: 5 NG/L
WBC # BLD AUTO: 7.3 X10(3) UL (ref 4–11)

## 2025-02-20 PROCEDURE — 70498 CT ANGIOGRAPHY NECK: CPT | Performed by: STUDENT IN AN ORGANIZED HEALTH CARE EDUCATION/TRAINING PROGRAM

## 2025-02-20 PROCEDURE — 70496 CT ANGIOGRAPHY HEAD: CPT | Performed by: STUDENT IN AN ORGANIZED HEALTH CARE EDUCATION/TRAINING PROGRAM

## 2025-02-20 PROCEDURE — 70551 MRI BRAIN STEM W/O DYE: CPT | Performed by: STUDENT IN AN ORGANIZED HEALTH CARE EDUCATION/TRAINING PROGRAM

## 2025-02-20 PROCEDURE — 99223 1ST HOSP IP/OBS HIGH 75: CPT | Performed by: STUDENT IN AN ORGANIZED HEALTH CARE EDUCATION/TRAINING PROGRAM

## 2025-02-20 PROCEDURE — 71045 X-RAY EXAM CHEST 1 VIEW: CPT

## 2025-02-20 RX ORDER — LABETALOL HYDROCHLORIDE 5 MG/ML
10 INJECTION, SOLUTION INTRAVENOUS ONCE
Status: COMPLETED | OUTPATIENT
Start: 2025-02-20 | End: 2025-02-20

## 2025-02-20 NOTE — ED INITIAL ASSESSMENT (HPI)
Pt to ER with complaints of htn. Reports seen at [facility]. Reports headahce. Patient reports \"189/103\". Denies any bp medications. Mary Grace paula/p sob.

## 2025-02-21 ENCOUNTER — APPOINTMENT (OUTPATIENT)
Dept: CV DIAGNOSTICS | Facility: HOSPITAL | Age: 70
End: 2025-02-21
Attending: INTERNAL MEDICINE
Payer: MEDICARE

## 2025-02-21 VITALS
RESPIRATION RATE: 14 BRPM | HEIGHT: 70 IN | SYSTOLIC BLOOD PRESSURE: 170 MMHG | TEMPERATURE: 98 F | BODY MASS INDEX: 31.12 KG/M2 | HEART RATE: 70 BPM | WEIGHT: 217.38 LBS | OXYGEN SATURATION: 92 % | DIASTOLIC BLOOD PRESSURE: 84 MMHG

## 2025-02-21 PROBLEM — I74.9 TIA DUE TO EMBOLISM (HCC): Status: ACTIVE | Noted: 2025-02-21

## 2025-02-21 PROBLEM — R26.81 GAIT INSTABILITY: Status: ACTIVE | Noted: 2025-02-21

## 2025-02-21 PROBLEM — G45.9 TIA DUE TO EMBOLISM (HCC): Status: ACTIVE | Noted: 2025-02-21

## 2025-02-21 LAB
ANION GAP SERPL CALC-SCNC: 7 MMOL/L (ref 0–18)
ATRIAL RATE: 81 BPM
BASOPHILS # BLD AUTO: 0.05 X10(3) UL (ref 0–0.2)
BASOPHILS NFR BLD AUTO: 0.7 %
BUN BLD-MCNC: 12 MG/DL (ref 9–23)
CALCIUM BLD-MCNC: 9.6 MG/DL (ref 8.7–10.6)
CHLORIDE SERPL-SCNC: 103 MMOL/L (ref 98–112)
CHOLEST SERPL-MCNC: 221 MG/DL (ref ?–200)
CO2 SERPL-SCNC: 31 MMOL/L (ref 21–32)
CREAT BLD-MCNC: 1.24 MG/DL
EGFRCR SERPLBLD CKD-EPI 2021: 62 ML/MIN/1.73M2 (ref 60–?)
EOSINOPHIL # BLD AUTO: 0.12 X10(3) UL (ref 0–0.7)
EOSINOPHIL NFR BLD AUTO: 1.8 %
ERYTHROCYTE [DISTWIDTH] IN BLOOD BY AUTOMATED COUNT: 13.4 %
EST. AVERAGE GLUCOSE BLD GHB EST-MCNC: 171 MG/DL (ref 68–126)
GLUCOSE BLD-MCNC: 144 MG/DL (ref 70–99)
HBA1C MFR BLD: 7.6 % (ref ?–5.7)
HCT VFR BLD AUTO: 46.4 %
HDLC SERPL-MCNC: 29 MG/DL (ref 40–59)
HGB BLD-MCNC: 15.4 G/DL
IMM GRANULOCYTES # BLD AUTO: 0.02 X10(3) UL (ref 0–1)
IMM GRANULOCYTES NFR BLD: 0.3 %
LDLC SERPL CALC-MCNC: 164 MG/DL (ref ?–100)
LYMPHOCYTES # BLD AUTO: 1.69 X10(3) UL (ref 1–4)
LYMPHOCYTES NFR BLD AUTO: 25.1 %
MCH RBC QN AUTO: 29 PG (ref 26–34)
MCHC RBC AUTO-ENTMCNC: 33.2 G/DL (ref 31–37)
MCV RBC AUTO: 87.4 FL
MONOCYTES # BLD AUTO: 0.6 X10(3) UL (ref 0.1–1)
MONOCYTES NFR BLD AUTO: 8.9 %
NEUTROPHILS # BLD AUTO: 4.26 X10 (3) UL (ref 1.5–7.7)
NEUTROPHILS # BLD AUTO: 4.26 X10(3) UL (ref 1.5–7.7)
NEUTROPHILS NFR BLD AUTO: 63.2 %
NONHDLC SERPL-MCNC: 192 MG/DL (ref ?–130)
OSMOLALITY SERPL CALC.SUM OF ELEC: 294 MOSM/KG (ref 275–295)
P AXIS: 18 DEGREES
P-R INTERVAL: 194 MS
PLATELET # BLD AUTO: 206 10(3)UL (ref 150–450)
POTASSIUM SERPL-SCNC: 4.2 MMOL/L (ref 3.5–5.1)
Q-T INTERVAL: 356 MS
QRS DURATION: 76 MS
QTC CALCULATION (BEZET): 413 MS
R AXIS: -3 DEGREES
RBC # BLD AUTO: 5.31 X10(6)UL
SODIUM SERPL-SCNC: 141 MMOL/L (ref 136–145)
T AXIS: 24 DEGREES
TRIGL SERPL-MCNC: 151 MG/DL (ref 30–149)
VENTRICULAR RATE: 81 BPM
VLDLC SERPL CALC-MCNC: 30 MG/DL (ref 0–30)
WBC # BLD AUTO: 6.7 X10(3) UL (ref 4–11)

## 2025-02-21 PROCEDURE — 93306 TTE W/DOPPLER COMPLETE: CPT | Performed by: INTERNAL MEDICINE

## 2025-02-21 PROCEDURE — 99239 HOSP IP/OBS DSCHRG MGMT >30: CPT | Performed by: INTERNAL MEDICINE

## 2025-02-21 PROCEDURE — 99223 1ST HOSP IP/OBS HIGH 75: CPT | Performed by: OTHER

## 2025-02-21 RX ORDER — AMLODIPINE BESYLATE 5 MG/1
5 TABLET ORAL DAILY
Qty: 30 TABLET | Refills: 1 | Status: SHIPPED | OUTPATIENT
Start: 2025-02-21

## 2025-02-21 RX ORDER — BLOOD SUGAR DIAGNOSTIC
STRIP MISCELLANEOUS
Qty: 100 STRIP | Refills: 6 | Status: SHIPPED | OUTPATIENT
Start: 2025-02-21

## 2025-02-21 RX ORDER — METOCLOPRAMIDE HYDROCHLORIDE 5 MG/ML
10 INJECTION INTRAMUSCULAR; INTRAVENOUS EVERY 8 HOURS PRN
Status: DISCONTINUED | OUTPATIENT
Start: 2025-02-21 | End: 2025-02-21

## 2025-02-21 RX ORDER — BISACODYL 10 MG
10 SUPPOSITORY, RECTAL RECTAL
Status: DISCONTINUED | OUTPATIENT
Start: 2025-02-21 | End: 2025-02-21

## 2025-02-21 RX ORDER — BLOOD-GLUCOSE METER
EACH MISCELLANEOUS
Qty: 1 KIT | Refills: 0 | Status: SHIPPED | OUTPATIENT
Start: 2025-02-21

## 2025-02-21 RX ORDER — SODIUM PHOSPHATE, DIBASIC AND SODIUM PHOSPHATE, MONOBASIC 7; 19 G/230ML; G/230ML
1 ENEMA RECTAL ONCE AS NEEDED
Status: DISCONTINUED | OUTPATIENT
Start: 2025-02-21 | End: 2025-02-21

## 2025-02-21 RX ORDER — POLYETHYLENE GLYCOL 3350 17 G/17G
17 POWDER, FOR SOLUTION ORAL DAILY PRN
Status: DISCONTINUED | OUTPATIENT
Start: 2025-02-21 | End: 2025-02-21

## 2025-02-21 RX ORDER — LISINOPRIL 20 MG/1
20 TABLET ORAL DAILY
Status: DISCONTINUED | OUTPATIENT
Start: 2025-02-21 | End: 2025-02-21

## 2025-02-21 RX ORDER — ATORVASTATIN CALCIUM 40 MG/1
40 TABLET, FILM COATED ORAL NIGHTLY
Qty: 30 TABLET | Refills: 0 | Status: SHIPPED | OUTPATIENT
Start: 2025-02-21

## 2025-02-21 RX ORDER — ONDANSETRON 2 MG/ML
4 INJECTION INTRAMUSCULAR; INTRAVENOUS EVERY 6 HOURS PRN
Status: DISCONTINUED | OUTPATIENT
Start: 2025-02-21 | End: 2025-02-21

## 2025-02-21 RX ORDER — ATORVASTATIN CALCIUM 40 MG/1
40 TABLET, FILM COATED ORAL NIGHTLY
Status: DISCONTINUED | OUTPATIENT
Start: 2025-02-21 | End: 2025-02-21

## 2025-02-21 RX ORDER — LISINOPRIL 20 MG/1
20 TABLET ORAL DAILY
Qty: 30 TABLET | Refills: 0 | Status: SHIPPED | OUTPATIENT
Start: 2025-02-22 | End: 2025-02-21

## 2025-02-21 RX ORDER — ECHINACEA PURPUREA EXTRACT 125 MG
1 TABLET ORAL
Status: DISCONTINUED | OUTPATIENT
Start: 2025-02-21 | End: 2025-02-21

## 2025-02-21 RX ORDER — SENNOSIDES 8.6 MG
17.2 TABLET ORAL NIGHTLY PRN
Status: DISCONTINUED | OUTPATIENT
Start: 2025-02-21 | End: 2025-02-21

## 2025-02-21 RX ORDER — LABETALOL HYDROCHLORIDE 5 MG/ML
10 INJECTION, SOLUTION INTRAVENOUS EVERY 4 HOURS PRN
Status: DISCONTINUED | OUTPATIENT
Start: 2025-02-21 | End: 2025-02-21

## 2025-02-21 RX ORDER — LANCETS 33 GAUGE
EACH MISCELLANEOUS
Qty: 100 EACH | Refills: 6 | Status: SHIPPED | OUTPATIENT
Start: 2025-02-21

## 2025-02-21 RX ORDER — BENZONATATE 200 MG/1
200 CAPSULE ORAL 3 TIMES DAILY PRN
Status: DISCONTINUED | OUTPATIENT
Start: 2025-02-21 | End: 2025-02-21

## 2025-02-21 RX ORDER — BLOOD SUGAR DIAGNOSTIC
STRIP MISCELLANEOUS
Qty: 100 EACH | Refills: 6 | Status: SHIPPED | OUTPATIENT
Start: 2025-02-21

## 2025-02-21 RX ORDER — ACETAMINOPHEN 500 MG
500 TABLET ORAL EVERY 4 HOURS PRN
Status: DISCONTINUED | OUTPATIENT
Start: 2025-02-21 | End: 2025-02-21

## 2025-02-21 NOTE — PLAN OF CARE
NURSING ADMISSION NOTE      Patient admitted via Cart  Oriented to room.  Safety precautions initiated.  Bed in low position.  Call light in reach.    Received patient alert and oriented x 4. Unsteady gait had resolved.   Normal sinus rhythm on Telemetry monitoring  O2 protocol, , sats 97%. Clear lungs.   Fall precaution, bed alarm on, non-skid socks on,  call light and bedside table within reach.  Monitor intake and output.  Electrolyte protocol.  SCD for VTE   Neuro check q 4 hrs.     Problem: Patient/Family Goals  Goal: Patient/Family Long Term Goal  Description: Patient's Long Term Goal: discharge home when medically stable    Interventions:  Telemetry monitoring  O2 protocol,   Fall precaution, bed alarm on, non-skid socks on,  call light and bedside table within reach.  Monitor intake and output.  Electrolyte protocol.  SCD for VTE   Neuro check q 4 hrs.   - See additional Care Plan goals for specific interventions  Outcome: Progressing  Goal: Patient/Family Short Term Goal  Description: Patient's Short Term Goal: Neuro vitals stable this shift.     Interventions:   Telemetry monitoring  O2 protocol,   Fall precaution, bed alarm on, non-skid socks on,  call light and bedside table within reach.  Monitor intake and output.  Electrolyte protocol.  SCD for VTE   Neuro check q 4 hrs.     - See additional Care Plan goals for specific interventions  Outcome: Progressing     Problem: CARDIOVASCULAR - ADULT  Goal: Absence of cardiac arrhythmias or at baseline  Description: INTERVENTIONS:  - Continuous cardiac monitoring, monitor vital signs, obtain 12 lead EKG if indicated  - Evaluate effectiveness of antiarrhythmic and heart rate control medications as ordered  - Initiate emergency measures for life threatening arrhythmias  - Monitor electrolytes and administer replacement therapy as ordered  Outcome: Progressing     Problem: NEUROLOGICAL - ADULT  Goal: Achieves stable or improved neurological  status  Description: INTERVENTIONS  - Assess for and report changes in neurological status  - Initiate measures to prevent increased intracranial pressure  - Maintain blood pressure and fluid volume within ordered parameters to optimize cerebral perfusion and minimize risk of hemorrhage  - Monitor temperature, glucose, and sodium. Initiate appropriate interventions as ordered  Outcome: Progressing

## 2025-02-21 NOTE — ED PROVIDER NOTES
History     Chief Complaint   Patient presents with    Hypertension       HPI    69 year old male sent in from urgent care for assessment.  Patient states he got up around 5:00 this morning to walk his dogs, he felt like he was drifting to the sides and stumbling as if he was drunk, this lasted minutes until he went back to bed.  Hours later he awoke and he felt normal.  He has been feeling normal since.  he denies weakness, paresthesias, vertigo, diplopia.  He does note a mild dull aching frontal headache which has since resolved.  No sudden or severe symptoms.  Noted at the urgent care that his blood pressure was quite elevated and sent here.  Patient does not have a prior diagnosis of hypertension, he does not see primary care routinely.          Past Medical History:    Gastric ulcer       Past Surgical History:   Procedure Laterality Date    Colonoscopy N/A 2019    Procedure: COLONOSCOPY, POSSIBLE BIOPSY, POSSIBLE POLYPECTOMY 64653;  Surgeon: Nitza Naik MD;  Location: [facility]    Hernia surgery      R/L       Social History     Socioeconomic History    Marital status:    Tobacco Use    Smoking status: Former     Current packs/day: 0.00     Average packs/day: 0.3 packs/day for 2.0 years (0.6 ttl pk-yrs)     Types: Cigarettes     Start date: 1974     Quit date: 1976     Years since quittin.1    Smokeless tobacco: Never    Tobacco comments:     quit    Vaping Use    Vaping status: Never Used   Substance and Sexual Activity    Alcohol use: Not Currently     Comment: occ    Drug use: No                   Physical Exam     ED Triage Vitals   BP 25 1620 (!) 184/110   Pulse 25 1620 92   Resp 25 1812 18   Temp 25 1619 97.2 °F (36.2 °C)   Temp src 25 1619 Temporal   SpO2 25 1620 94 %   O2 Device 25 1620 None (Room air)       Physical Exam  Constitutional:       General: He is not in acute distress.  Eyes:      Extraocular  Movements: Extraocular movements intact.   Neck:      Vascular: No carotid bruit.   Cardiovascular:      Rate and Rhythm: Normal rate and regular rhythm.      Pulses: Normal pulses.   Pulmonary:      Effort: Pulmonary effort is normal. No respiratory distress.   Abdominal:      General: Abdomen is flat. There is no distension.      Tenderness: There is no abdominal tenderness.   Musculoskeletal:      Cervical back: Normal range of motion.   Skin:     General: Skin is warm.   Neurological:      Mental Status: He is alert and oriented to person, place, and time.      Cranial Nerves: No cranial nerve deficit.      Sensory: No sensory deficit.      Motor: No weakness.      Coordination: Coordination normal.      Gait: Gait normal.      Comments: No visual field deficits  Gait normal              ED Course     Labs Reviewed   COMP METABOLIC PANEL (14) - Abnormal; Notable for the following components:       Result Value    Glucose 156 (*)     Calculated Osmolality 297 (*)     Albumin 4.9 (*)     All other components within normal limits   TROPONIN I HIGH SENSITIVITY - Normal   CBC WITH DIFFERENTIAL WITH PLATELET   HEMOGLOBIN A1C     CTA BRAIN + CTA CAROTIDS (CPT=70496/73953)    Result Date: 2/20/2025  CONCLUSION:  No acute intracranial hemorrhage or midline shift.  Diffuse cerebral and cerebellar atrophy with chronic microvascular ischemic changes of aging.  There is mild calcific plaque involving the carotid bulbs bilaterally and left proximal internal carotid artery.  Mild to moderate calcific plaque involving the cavernous portions of the internal carotid arteries bilaterally.  No intracranial large vessel occlusion or significant stenosis.  Ectatic ascending aorta.   LOCATION:  [facility]   Dictated by (CST): Felisa Fernandez MD on 2/20/2025 at 9:35 PM     Finalized by (CST): Felisa Fernandez MD on 2/20/2025 at 9:42 PM       MRI BRAIN (CPT=70551)    Result Date: 2/20/2025  CONCLUSION:  1. No acute infarct or acute hemorrhage  or mass.  Minimal chronic microvascular disease in the deep white matter of the cerebrum. 2. Incidental note is made of a 3 mm focus of hypointensity on the gradient echo sequence in the cortex of the right parietal lobe either representing a small calcification or small remote micro bleed.    LOCATION:  [id number]   Dictated by (CST): Reagan Gamble MD on 2/20/2025 at 9:05 PM     Finalized by (CST): Reagan Gamble MD on 2/20/2025 at 9:08 PM       XR CHEST AP PORTABLE  (CPT=71045)    Result Date: 2/20/2025  CONCLUSION:  Normal examination for a patient of this age.    LOCATION:  [id number]      Dictated by (CST): Reagan Gamble MD on 2/20/2025 at 5:13 PM     Finalized by (CST): Reagan Gamble MD on 2/20/2025 at 5:13 PM            MDM     Vitals:    02/20/25 2300 02/20/25 2330 02/21/25 0000 02/21/25 0025   BP: (!) 136/95 130/89 (!) 134/94 (!) 168/92   BP Location:    Right arm   Pulse: 70 69 72    Resp: 17 18 18 19   Temp:    97.8 °F (36.6 °C)   TempSrc:    Oral   SpO2: 97% 97% 97% 97%   Weight:    98.6 kg   Height:         Gait instability with drifting with ambulation now resolved.  Neurologic exam is normal.  Patient is quite hypertensive with no prior medical care.  History but concerning for TIA/CVA.      ED Course as of 02/21/25 0203  ------------------------------------------------------------  Time: 02/20 1859  Comment: EKG interpretation by me: EKG sinus rhythm at a rate of 81, axis nomal, no concerning acute ischemic ST changes  ------------------------------------------------------------  Time: 02/20 1859  Comment: CXR interpretation by me with no concerning acute findings    ------------------------------------------------------------  Time: 02/20 2008  Comment: Labs with reassuring renal function.  Troponin negative.  Normal hemoglobin.  ------------------------------------------------------------  Time: 02/20 2201  Comment: CT without large vessel occlusion.  Showing mild plaque and chronic  ischemic changes.  ------------------------------------------------------------  Time: 02/20 2204  Comment: Mri without acute ischemia noted. Findings of 3 mm focus of hypointensity on the gradient echo sequence in the cortex of the right parietal lobe either representing a small calcification or small remote micro bleed  ------------------------------------------------------------  Time: 02/20 2255  Comment: I consulted with neurology, recommending admission for higher risk TIA, blood pressure management and consultation with neurosurgery for possible bleed  ------------------------------------------------------------  Time: 02/20 2256  Comment: Blood pressure improved with medications.  Remains neurologically intact.         Disposition and Plan     Clinical Impression:  1. Hypertension, unspecified type    2. Gait instability    3. TIA due to embolism (HCC)        Disposition:  Admit    Follow-up:  No follow-up provider specified.    Medications Prescribed:  There are no discharge medications for this patient.      Hospital Problems       Present on Admission  Date Reviewed: 3/11/2020            ICD-10-CM Noted POA    * (Principal) Hypertension, unspecified type I10 2/20/2025 Unknown    Gait instability R26.81 2/21/2025 Unknown    TIA due to embolism (HCC) G45.9, I74.9 2/21/2025 Unknown

## 2025-02-21 NOTE — CONSULTS
[facility]   NEUROLOGY   CONSULT NOTE    Admission date: 2025  Reason for Consult: Hypertensive urgency.  Chief Complaint:   Chief Complaint   Patient presents with    Hypertension   ________________________________________________________________    History     History of Presenting Illness  69 year old male with history of peptic ulcer disease consulted for episode of disequilibrium while walking on straight line.  Symptoms were short-lived and lasted few minutes.  Family became concerned and brought patient to the emergency department where he was noted to have elevated blood pressure of 197/111 at presentation.  No diplopia, dysarthria, dysphagia, weakness, numbness, paresthesias, confusion, disorientation or loss of consciousness.  Currently back to baseline.    History obtained from patient, ER physician, chart review.    Past Medical History:    Gastric ulcer     Past Surgical History:   Procedure Laterality Date    Colonoscopy N/A 2019    Procedure: COLONOSCOPY, POSSIBLE BIOPSY, POSSIBLE POLYPECTOMY 39553;  Surgeon: Nitza Naik MD;  Location: [facility], [facility]    Hernia surgery      R/L     Social History     Socioeconomic History    Marital status:    Tobacco Use    Smoking status: Former     Current packs/day: 0.00     Average packs/day: 0.3 packs/day for 2.0 years (0.6 ttl pk-yrs)     Types: Cigarettes     Start date: 1974     Quit date: 1976     Years since quittin.1    Smokeless tobacco: Never    Tobacco comments:     quit    Vaping Use    Vaping status: Never Used   Substance and Sexual Activity    Alcohol use: Not Currently     Comment: occ    Drug use: No     Social Drivers of Health     Food Insecurity: No Food Insecurity (2025)    NCSS - Food Insecurity     Worried About Running Out of Food in the Last Year: No     Ran Out of Food in the Last Year: No   Transportation Needs: No Transportation Needs (2025)    NCSS -  Transportation     Lack of Transportation: No   Housing Stability: Not At Risk (2/21/2025)    NCSS - Housing/Utilities     Has Housing: Yes     Worried About Losing Housing: No     Unable to Get Utilities: No     Family History   Problem Relation Age of Onset    Other (Other) Father         MS vs ALS    Other (Other) Mother         mac degener     Allergies Allergies[1]    Home Meds  No current outpatient medications  Scheduled Meds:   lisinopril  20 mg Oral Daily     Continuous Infusions:  PRN Meds:  labetalol    acetaminophen    melatonin    polyethylene glycol (PEG 3350)    sennosides    bisacodyl    fleet enema    ondansetron    metoclopramide    benzonatate    glycerin-hypromellose-    sodium chloride    OBJECTIVE   VITAL SIGNS:   Temp:  [97.2 °F (36.2 °C)-98.5 °F (36.9 °C)] 97.8 °F (36.6 °C)  Pulse:  [64-92] 64  Resp:  [12-19] 15  BP: (121-197)/() 138/90  SpO2:  [94 %-100 %] 97 %    PHYSICAL EXAM:    NEUROLOGIC:    Mental Status:  A&O x 4, Follows simple commands, no obvious aphasia or dysarthria  Cranial nerves: PERRL.  Visual fields full.  EOMI.  Face symmetric with normal movement bilaterally.  Hearing grossly intact. Tongue midline with normal movements.   Motor: Drift:  Absent; Motor exam is 5 out of 5 in all extremities bilaterally  Sensation: Intact to light touch bilaterally  Cerebellar: Normal Finger-To-Nose test      LABORATORY DATA:  Last 24 hour labs were reviewed in detail.  Recent Labs   Lab 02/20/25 1645 02/21/25  0954    141   K 4.1 4.2    103   CO2 30.0 31.0   * 144*   BUN 11 12   CREATSERUM 1.16 1.24     Recent Labs   Lab 02/20/25  1644 02/21/25  0954   WBC 7.3 6.7   HGB 16.8 15.4   .0 206.0     Recent Labs   Lab 02/20/25 1645   ALT 40   AST 32     No results for input(s): \"MG\", \"PHOS\" in the last 168 hours.  Last A1c value was 7.6% done 2/20/2025.     Lab Results   Component Value Date     02/21/2025    HDL 29 02/21/2025    TRIG 151 02/21/2025     VLDL 30 02/21/2025        Radiology:    CTA BRAIN + CTA CAROTIDS (CPT=70496/60432)    Result Date: 2/20/2025  CONCLUSION:  No acute intracranial hemorrhage or midline shift.  Diffuse cerebral and cerebellar atrophy with chronic microvascular ischemic changes of aging.  There is mild calcific plaque involving the carotid bulbs bilaterally and left proximal internal carotid artery.  Mild to moderate calcific plaque involving the cavernous portions of the internal carotid arteries bilaterally.  No intracranial large vessel occlusion or significant stenosis.  Ectatic ascending aorta.   LOCATION:  [facility]   Dictated by (CST): Felisa Fernandez MD on 2/20/2025 at 9:35 PM     Finalized by (CST): Felisa Fernandez MD on 2/20/2025 at 9:42 PM       MRI BRAIN (CPT=70551)    Result Date: 2/20/2025  CONCLUSION:  1. No acute infarct or acute hemorrhage or mass.  Minimal chronic microvascular disease in the deep white matter of the cerebrum. 2. Incidental note is made of a 3 mm focus of hypointensity on the gradient echo sequence in the cortex of the right parietal lobe either representing a small calcification or small remote micro bleed.    LOCATION:  [id number]   Dictated by (CST): Reagan Gamble MD on 2/20/2025 at 9:05 PM     Finalized by (CST): Reagan Gamble MD on 2/20/2025 at 9:08 PM       XR CHEST AP PORTABLE  (CPT=71045)    Result Date: 2/20/2025  CONCLUSION:  Normal examination for a patient of this age.    LOCATION:  [id number]      Dictated by (CST): Reagan Gamble MD on 2/20/2025 at 5:13 PM     Finalized by (CST): Reagan Gamble MD on 2/20/2025 at 5:13 PM      ASSESSMENT/PLAN   69 year old male with:    Episode of disequilibrium suspected to be due to hypertensive urgency.  Blood pressure better controlled at this time.  Symptoms resolved.  MRI of the brain did not show any evidence of acute intracranial abnormality such as stroke.  Chronic ischemic changes were noted.  CTA of the head and neck did not show  LVO/critical stenosis.  , A1c 7.6.    Dyslipidemia.  Advise atorvastatin 40 mg daily.  No further neurology recommendations at this time.  Patient to follow-up with his primary care after discharge.  Family members counseled.  All questions answered.    Principal Problem:    Hypertension, unspecified type  Active Problems:    Gait instability    TIA due to embolism (HCC)       Jose Luis Moreland MD  Neurohospitalist  [facility]    Disclaimer: This record was dictated using Dragon software. There may be errors due to voice recognition problems that were not realized and corrected during the completion of the note.           [1] No Known Allergies

## 2025-02-21 NOTE — PHYSICAL THERAPY NOTE
PHYSICAL THERAPY EVALUATION - INPATIENT     Room Number: 0022/0022-A  Evaluation Date: 2025  Type of Evaluation: Initial  Physician Order: PT Eval and Treat    Presenting Problem: HTN, possible TIA  Co-Morbidities : gastric ulcer  Reason for Therapy: Mobility Dysfunction and Discharge Planning    Imaging:   -CT brain and CTA head and neck with chronic small vessel ischemic changes, mild-mod calcific plaque in cavernous internal carotid arteries  -MRI showing 3mm focus of hypointensity in right parietal lobe- calcification vs small remote micro bleed    PHYSICAL THERAPY ASSESSMENT   Patient is a 69 year old male admitted 2025 for HTN, possible TIA.   Patient is currently functioning at baseline with bed mobility, transfers, gait, and stair negotiation. Prior to admission, patient's baseline is independent.     Patient no longer requires IP PT and is safe to return home.    PLAN  Patient has been evaluated and presents with no skilled Physical Therapy needs at this time.  Patient discharged from Physical Therapy services.  Please re-order if a new functional limitation presents during this admission.    PT Device Recommendation: None    GOALS  Patient was able to achieve the following goals ...    Patient was able to transfer At previous, functional level   Patient able to ambulate on level surfaces At previous, functional level     HOME SITUATION  Type of Home: House  Home Layout: One level (raised ranch)  Stairs to Enter : 5   Railing: Yes (bilateral)              Lives With: Spouse    Drives: Yes   Patient Regularly Uses: Glasses     Prior Level of [place]: [place] with his wife. There are 5 steps to enter with bilateral rails. Pt drives and wears glasses.    SUBJECTIVE  \"For what?\"    OBJECTIVE  Precautions: None  Fall Risk: Standard fall risk    WEIGHT BEARING RESTRICTION     PAIN ASSESSMENT  Ratin          COGNITION  Overall Cognitive Status:  WFL - within functional  limits    RANGE OF MOTION AND STRENGTH ASSESSMENT  Upper extremity ROM and strength are within functional limits   Lower extremity ROM is within functional limits   Lower extremity strength is within functional limits     BALANCE  Static Sitting: Normal  Dynamic Sitting: Normal  Static Standing: Normal  Dynamic Standing: Normal    ADDITIONAL TESTS                 Modified Carlos: 0                  ACTIVITY TOLERANCE                         O2 WALK       NEUROLOGICAL FINDINGS  Neurological Findings: Coordination - Heel to Shin     Coordination - Heel to Shin: Symmetrical               AM-PAC '6-Clicks' INPATIENT SHORT FORM - BASIC MOBILITY  How much difficulty does the patient currently have...  Patient Difficulty: Turning over in bed (including adjusting bedclothes, sheets and blankets)?: None   Patient Difficulty: Sitting down on and standing up from a chair with arms (e.g., wheelchair, bedside commode, etc.): None   Patient Difficulty: Moving from lying on back to sitting on the side of the bed?: None   How much help from another person does the patient currently need...   Help from Another: Moving to and from a bed to a chair (including a wheelchair)?: None   Help from Another: Need to walk in hospital room?: None   Help from Another: Climbing 3-5 steps with a railing?: None       AM-PAC Score:  Raw Score: 24   Approx Degree of Impairment: 0%   Standardized Score (AM-PAC Scale): 61.14   CMS Modifier (G-Code): CH    FUNCTIONAL ABILITY STATUS  Gait Assessment   Functional Mobility/Gait Assessment  Gait Assistance: Independent  Distance (ft): 200  Assistive Device: None  Pattern: Within Functional Limits    Skilled Therapy Provided   Educated on importance of continued out of bed mobility and ambulation     Sit to stand> neurological screen> sit to stand> ambulated 200 feet> up in bed at end of session    Bed Mobility:  Rolling: indp  Supine to sit: indp   Sit to supine: indp     Transfer Mobility:  Sit to stand:  indp   Stand to sit: indp  Gait = Indp 200 feet, no LOB or gait deviations noted    Therapist's comments:RN gave clearance to see patient. Wife present during session. Discussed role and goal of physical therapy in hospital setting. Pt in agreement to session.       Exercise/Education Provided:  Functional activity tolerated  Gait training  Transfer training    Patient End of Session: In bed;Needs met;Call light within reach;RN aware of session/findings;All patient questions and concerns addressed;Hospital anti-slip socks;Family present    Patient Evaluation Complexity Level:  History Low - no personal factors and/or co-morbidities   Examination of body systems Low -  addressing 1-2 elements   Clinical Presentation Low- Stable   Clinical Decision Making Low Complexity       PT Session Time: 25 minutes  Therapeutic Activity: 20 minutes

## 2025-02-21 NOTE — OCCUPATIONAL THERAPY NOTE
OCCUPATIONAL THERAPY EVALUATION - INPATIENT    Room Number: 0022/0022-A  Evaluation Date: 2/21/2025     Type of Evaluation: Initial  Presenting Problem: Elevated BP    Physician Order: IP Consult to Occupational Therapy  Reason for Therapy:  ADL/IADL Dysfunction and Discharge Planning    OCCUPATIONAL THERAPY ASSESSMENT   Patient is a 69 year old male admitted on 2/20/2025 from urgent care clinic for elevated BP and episode of imbalance.   Co-Morbidities : gastric ulcer      Patient participated well in eval, able to complete ADLs and functional mobility/transfers safely and independently without device. Pt able to participate in BUE ROM/MMT testing, coordination, sensation, and vision screening with no deficits noted. No further questions/concerns voiced. No OT needs anticipated at DC.     Recommendations for nursing staff:   Transfers: up ad jina   Toileting location: Toilet    EVALUATION SESSION:  Patient at start of session: seated EOB eating breakfast    FUNCTIONAL TRANSFER ASSESSMENT  Sit to Stand: Edge of Bed  Edge of Bed: Independent  Toilet Transfer: Independent    BED MOBILITY  Supine to Sit : Independent  Sit to Supine (OT): Independent  Scooting: IND    BALANCE ASSESSMENT  Static Sitting: Independent  Static Standing: Independent    FUNCTIONAL ADL ASSESSMENT  LB Dressing Seated: Independent  LB Dressing Standing: Independent    ACTIVITY TOLERANCE: Pt on room air and denies SOB, dizziness or lightheadedness throughout session. No significant change in vitals noted.     COGNITION  Overall Cognitive Status:  WFL - within functional limits    COGNITION ASSESSMENTS     Upper Extremity:   ROM: within functional limits   Strength: is within functional limits   Coordination:  Gross motor: intact   Fine motor: intact  Sensation: Light touch:  intact    EDUCATION PROVIDED  Patient Education : Role of Occupational Therapy; Plan of Care  Patient's Response to Education: Verbalized Understanding    Equipment used:  n/a    Patient End of Session: In bed;Needs met;RN aware of session/findings;Call light within reach;All patient questions and concerns addressed;Hospital anti-slip socks;Family present    OCCUPATIONAL PROFILE    HOME SITUATION  Type of Home: House  Home Layout: One level (raised ranch)  Lives With: Spouse          Other Equipment: None    Occupation/Status: retired - moving furniture     Drives: Yes  Patient Regularly Uses: Glasses    Prior Level of Function: Pt is typically independent with all aspects of mobility and self-cares without device.    SUBJECTIVE  \"I couldn't walk straight. But then I took a 5 hour nap and I was fine.\"    PAIN ASSESSMENT  Ratin  Location: denies       OBJECTIVE     Fall Risk: Standard fall risk    WEIGHT BEARING RESTRICTION       AM-PAC ‘6-Clicks’ Inpatient Daily Activity Short Form  -   Putting on and taking off regular lower body clothing?: None  -   Bathing (including washing, rinsing, drying)?: None  -   Toileting, which includes using toilet, bedpan or urinal? : None  -   Putting on and taking off regular upper body clothing?: None  -   Taking care of personal grooming such as brushing teeth?: None  -   Eating meals?: None    AM-PAC Score:  Score: 24  Approx Degree of Impairment: 0%  Standardized Score (AM-PAC Scale): 57.54    ADDITIONAL TESTS     NEUROLOGICAL FINDINGS  Coordination - Finger to Nose: Symmetrical  Coordination - Rapid Alternating Movement: Symmetrical  Coordination - Finger Opposition: Symmetrical     PLAN   Patient has been evaluated and presents with no skilled Occupational Therapy needs at this time.  Patient discharged from Occupational Therapy services.  Please re-order if a new functional limitation presents during this admission.    OT Device Recommendations: None    Patient Evaluation Complexity Level:   Occupational Profile/Medical History LOW - Brief history including review of medical or therapy records    Specific performance deficits impacting  engagement in ADL/IADL LOW  1 - 3 performance deficits    Client Assessment/Performance Deficits LOW - No comorbidities nor modifications of tasks    Clinical Decision Making LOW - Analysis of occupational profile, problem-focused assessments, limited treatment options    Overall Complexity LOW     OT Session Time: 15 minutes    Can add/delete any of these

## 2025-02-21 NOTE — H&P
[facility]     Abdias Vann Patient Status:  Emergency    4/3/1955 MRN [id number]   Location [facility] EMERGENCY DEPARTMENT Attending Zana Childs MD   Hosp Day # 0 PCP No primary care provider on file.     Chief Complaint: high blood pressure    Subjective:    History of Present Illness:     Abdias Vann is a 69 year old male with PMHx gastric ulcer who presented to the hospital for high blood pressure. He felt fine yesterday and went to bed around 1 AM this morning. He woke up to let the dog outside around 5 AM and noticed his equilibrium was off and he was not walking in a straight line. He went back to bed and woke up around 10AM and his symptoms resolved. He decided to get checked out at Urgent care and was sent in for high blood pressures. He now feels at his baseline state of health and denied any complaints. He denied any problems with his speech, muscle weakness, visual changes, or paresthesia.    History/Other:    Past Medical History:  Past Medical History:    Gastric ulcer     Past Surgical History:   Past Surgical History:   Procedure Laterality Date    Colonoscopy N/A 2019    Procedure: COLONOSCOPY, POSSIBLE BIOPSY, POSSIBLE POLYPECTOMY 71274;  Surgeon: Nitza Naik MD;  Location: [facility]    Hernia surgery      R/L      Family History:   Family History   Problem Relation Age of Onset    Other (Other) Father         MS vs ALS    Other (Other) Mother         mac degener     Social History:    reports that he quit smoking about 49 years ago. His smoking use included cigarettes. He started smoking about 51 years ago. He has a 0.6 pack-year smoking history. He has never used smokeless tobacco. He reports that he does not currently use alcohol. He reports that he does not use drugs.     Allergies: Allergies[1]    Medications:  Medications Ordered Prior to Encounter[2]    Review of Systems:   A comprehensive review of systems was  completed.    Pertinent positives and negatives noted in the HPI.    Objective:   Physical Exam:    BP (!) 181/101   Pulse 84   Temp 97.2 °F (36.2 °C) (Temporal)   Resp 17   Ht 5' 10\" (1.778 m)   Wt 225 lb (102.1 kg)   SpO2 96%   BMI 32.28 kg/m²   General: No acute distress, Alert  Respiratory: No rhonchi, no wheezes  Cardiovascular: S1, S2. Regular rate and rhythm  Abdomen: Soft, Non-tender, non-distended, positive bowel sounds  Neuro: No new focal deficits  Extremities: No edema    Results:    Labs:      Labs Last 24 Hours:    Recent Labs   Lab 02/20/25  1644   RBC 5.62   HGB 16.8   HCT 46.8   MCV 83.3   MCH 29.9   MCHC 35.9   RDW 13.3   NEPRELIM 5.31   WBC 7.3   .0       Recent Labs   Lab 02/20/25  1645   *   BUN 11   CREATSERUM 1.16   EGFRCR 67   CA 10.0   ALB 4.9*      K 4.1      CO2 30.0   ALKPHO 89   AST 32   ALT 40   BILT 1.0   TP 7.6       Estimated Glomerular Filtration Rate: 67.3 mL/min/1.73m2 (by CKD-EPI based on SCr of 1.16 mg/dL).    Lab Results   Component Value Date    INR 1.03 02/09/2020    INR 1.1 05/30/2019       Recent Labs   Lab 02/20/25  1645   TROPHS 5       No results for input(s): \"TROP\", \"PBNP\" in the last 168 hours.    No results for input(s): \"PCT\" in the last 168 hours.    Imaging: Imaging data reviewed in Epic.    Assessment & Plan:      #HTN urgency  #possible TIA  #abnormal brain MRI  -BP as high as 197/111  -EKG showing NSR @81 bpm  -CT brain and CTA head and neck with chronic small vessel ischemic changes, mild-mod calcific plaque in cavernous internal carotid arteries  -MRI showing 3mm focus of hypointensity in right parietal lobe- calcification vs small remote micro bleed  -plan for observation  -goal 25% reduction in BP over 24 hrs: labetalol prn  -monitor on telemetry  -PT/ OT eval  -check lipid panel and HgA1C  -neurology c/s in ED  -neurosurgery c/s in ED      Plan of care discussed with ED physician    Uyen Barrientos,     Supplementary  Documentation:     The 21st Century Cures Act makes medical notes like these available to patients in the interest of transparency. Please be advised this is a medical document. Medical documents are intended to carry relevant information, facts as evident, and the clinical opinion of the practitioner. The medical note is intended as peer to peer communication and may appear blunt or direct. It is written in medical language and may contain abbreviations or verbiage that are unfamiliar.                                       [1] No Known Allergies  [2]   No current facility-administered medications on file prior to encounter.     Current Outpatient Medications on File Prior to Encounter   Medication Sig Dispense Refill    omeprazole 20 MG Oral Capsule Delayed Release 20 mg po qAM (Patient not taking: Reported on 7/9/2022) 90 capsule 11    omeprazole 20 MG Oral Capsule Delayed Release Take 1 capsule (20 mg total) by mouth 2 (two) times daily before meals. (Patient not taking: Reported on 7/9/2022) 60 capsule 3

## 2025-02-21 NOTE — ED QUICK NOTES
Orders for admission, patient is aware of plan and ready to go upstairs. Any questions, please call ED RN Coty at extension [phone].     Patient Covid vaccination status: Unvaccinated     COVID Test Ordered in ED: None    COVID Suspicion at Admission: N/A    Running Infusions:  None    Mental Status/LOC at time of transport: A&O x4    Other pertinent information:   CIWA score: N/A   NIH score:  N/A

## 2025-02-22 NOTE — DISCHARGE SUMMARY
[facility]     Abdias Vann Patient Status:  Observation    4/3/1955 MRN [id number]   Location [facility] 0SW-A Attending No att. providers found   Hosp Day # 0 PCP No primary care provider on file.     Date of Admission: 2025  Date of Discharge:  2025     Discharge Disposition: Home or Self Care    Discharge Diagnosis:  Hypertensive urgency  Possible TIA  Type 2 diabetes  Obesity, BMI 31.19    History of Present Illness:    69 year old male with PMHx gastric ulcer who presented to the hospital for high blood pressure. He felt fine yesterday and went to bed around 1 AM this morning. He woke up to let the dog outside around 5 AM and noticed his equilibrium was off and he was not walking in a straight line. He went back to bed and woke up around 10AM and his symptoms resolved. He decided to get checked out at Urgent care and was sent in for high blood pressures. He now feels at his baseline state of health and denied any complaints. He denied any problems with his speech, muscle weakness, visual changes, or paresthesia.     Brief Synopsis:   #Hypertensive urgency  #Possible TIA  Patient presented with elevated blood pressures and unsteady gait.  CT brain and CTA head and neck with chronic small vessel ischemic changes, mild to moderate calcific plaque in the cavernous internal carotid arteries.  MRI showed 3 mm focus of hypointensity in the right parietal lobe which could be a calcification versus small remote microbleed.  Patient was started on lisinopril for blood pressure management however he felt like he was developing a cough that was discontinued.  He was discharged with amlodipine 5 mg daily with plan to uptitrate with primary care physician.  LDL elevated so he was placed on atorvastatin 40 mg daily.  A1c was 7.6.  Lifestyle changes recommended.  PCP follow-up.  Metformin 500 mg twice daily initiated at discharge.    Lace+ Score: 45  59-90 High Risk  29-58  Medium Risk  0-28   Low Risk  Patient was referred to the [facility].    TCM Follow-Up Recommendation:  LACE 29-58: Moderate Risk of readmission after discharge from the hospital.      Procedures during hospitalization:   NA    Incidental or significant findings and recommendations (brief descriptions):  NA    Lab/Test results pending at Discharge:   NA    Consultants:  Neurology     Discharge Medication List:     Discharge Medications        START taking these medications        Instructions Prescription details   amLODIPine 5 MG Tabs  Commonly known as: Norvasc      Take 1 tablet (5 mg total) by mouth daily.   Quantity: 30 tablet  Refills: 1     atorvastatin 40 MG Tabs  Commonly known as: Lipitor      Take 1 tablet (40 mg total) by mouth nightly.   Quantity: 30 tablet  Refills: 0     metFORMIN 500 MG Tabs  Commonly known as: Glucophage  Start taking on: February 21, 2025      Take 1 tablet (500 mg total) by mouth daily for 3 days, THEN 1 tablet (500 mg total) 2 (two) times daily with meals.   Stop taking on: March 26, 2025  Quantity: 60 tablet  Refills: 0     OneTouch Delica Lancets 33G Misc      Test blood sugar 1 times per day.   Quantity: 100 each  Refills: 6     OneTouch Delica Lancets 33G Misc      Test blood sugar 1 times per day. [E11.9]   Quantity: 100 each  Refills: 6     OneTouch Verio Flex System w/Device Kit      Test blood sugar 1 times per day.   Quantity: 1 kit  Refills: 0     OneTouch Ultra 2 w/Device Kit      Test blood sugar 1 times per day. [E11.9]   Quantity: 1 kit  Refills: 0     OneTouch Verio Strp      Test blood sugar 1 times per day.   Quantity: 100 strip  Refills: 6     OneTouch Ultra Strp  Generic drug: Glucose Blood      Test blood sugar 1 times per day. [E11.9]   Quantity: 100 each  Refills: 6               Where to Get Your Medications        These medications were sent to [id number] - [place], [place] - [address] [phone], [phone]  [address]  [facility] [address]      Phone: [phone]   OneTouch Delica Lancets 33G Misc  OneTouch Ultra 2 w/Device Kit  OneTouch Ultra Strp       These medications were sent to [id number] - [place], [place] - [address] [phone], [phone]  [address] [facility] [address]      Phone: [phone]   amLODIPine 5 MG Tabs  atorvastatin 40 MG Tabs  metFORMIN 500 MG Tabs  OneTouch Delica Lancets 33G Misc  OneTouch Verio Flex System w/Device Kit  OneTouch Verio Strp         [place]    Follow-up appointment:   Jose Luis Moreland MD  [address] [facility] [address]  [place] [address]  [phone]    Follow up  As needed    Dusty Crum MD  [address] [facility] [address]  [place] [address]  [phone]    Call in 1 week(s)  Establish care    Appointments for Next 30 Days 2025 - 3/23/2025      None            Vital signs:  Temp:  [97.8 °F (36.6 °C)-98.5 °F (36.9 °C)] 98.2 °F (36.8 °C)  Pulse:  [64-85] 70  Resp:  [12-19] 14  BP: (121-182)/() 170/84  SpO2:  [92 %-99 %] 92 %    Physical Exam:    General: No acute distress  Lungs: clear to auscultation  Cardiovascular: S1, S2  Abdomen: Soft    -----------------------------------------------------------------------------------------------  PATIENT DISCHARGE INSTRUCTIONS: See electronic chart    Natty Garcia MD    Total time spent on discharge plannin minutes     The  Cures Act makes medical notes like these available to patients in the interest of transparency. Please be advised this is a medical document. Medical documents are intended to carry relevant information, facts as evident, and the clinical opinion of the practitioner. The medical note is intended as peer to peer communication and may appear blunt or direct. It is written in medical language and may contain abbreviations or verbiage that are unfamiliar.

## 2025-02-24 ENCOUNTER — PATIENT OUTREACH (OUTPATIENT)
Dept: CASE MANAGEMENT | Age: 70
End: 2025-02-24

## 2025-02-24 NOTE — PROGRESS NOTES
TCM Request   Hospital Follow up for TCC (Discharge 2/21 edw )     [facility]   [facility]   [address], [address]  [phone]   Neurology    Jose Luis Moreland   [address]    [address] [address]   [phone]   Attempt #1:  Left message on voicemail for patient to call transitions specialist back to schedule follow up appointments. Provided Transitions specialist scheduling phone number [phone].

## 2025-02-25 NOTE — PROGRESS NOTES
TCM Request   Hospital Follow up for TCC (Discharge 2/21 edw )      [facility] Clinic   [facility]   [address], [address]  [phone]       Neurology   Jose Luis Moreland   [address]    [address] [address]   [phone]         Attempt #2:  Left message on voicemail for patient to call transitions specialist back to schedule follow up appointments. Provided Transitions specialist scheduling phone number [phone].

## (undated) DEVICE — FILTERLINE NASAL ADULT O2/CO2

## (undated) DEVICE — 1200CC GUARDIAN II: Brand: GUARDIAN

## (undated) DEVICE — FORCEP BIOPSY RJ4 LG CAP W/ND

## (undated) DEVICE — ENDOSCOPY PACK UPPER: Brand: MEDLINE INDUSTRIES, INC.

## (undated) DEVICE — Device: Brand: DEFENDO AIR/WATER/SUCTION AND BIOPSY VALVE

## (undated) DEVICE — CATH GOLD PROBE HEMOGLIDE 7FR

## (undated) DEVICE — 3M™ RED DOT™ MONITORING ELECTRODE WITH FOAM TAPE AND STICKY GEL, 50/BAG, 20/CASE, 72/PLT 2570: Brand: RED DOT™

## (undated) NOTE — LETTER
Date & Time: 2/20/2025, 10:41 PM  Encounter Provider(s):    Zana Childs MD       To Whom It May Concern:    Ruba Posadas was in our department on 2/20/2025 for family being treated and seen in the Emergency Department.   If you have any questions or concerns, please do not hesitate to call.        _____________________________  Physician/APC Signature

## (undated) NOTE — LETTER
Danisha Crews [address]  [place], [address] [place] [address]    Consent for Operation  Date: __________________                                Time: _______________    1.  I authorize the performance upon Chapo Alegria the following operation:  Kvng Murray videotape. [facility] will not be responsible for storage or maintenance of this tape. 6. For the purpose of advancing medical education, I consent to the admittance of observers to the Operating Room.   7. I authorize the use of any specimen, organs, ti When the patient is a minor or mentally incompetent to give consent:  Signature of person authorized to consent for patient: ___________________________   Relationship to patient: ____________________________________________________    Signature of Witness these medicines may increase my risk of anesthetic complications. iv. If I am allergic to anything or have had a reaction to anesthesia before. 3. I understand how the anesthesia medicine will help me (benefits).   4. I understand that with any type of an patient’s representative) and answered their questions. The patient or their representative has agreed to have anesthesia services.     _____________________________________________________________________________  Witness        Date   Time  I have jarvis